# Patient Record
Sex: MALE | Race: WHITE | NOT HISPANIC OR LATINO | URBAN - METROPOLITAN AREA
[De-identification: names, ages, dates, MRNs, and addresses within clinical notes are randomized per-mention and may not be internally consistent; named-entity substitution may affect disease eponyms.]

---

## 2020-10-20 ENCOUNTER — OFFICE VISIT CONVERTED (OUTPATIENT)
Dept: FAMILY MEDICINE CLINIC | Facility: CLINIC | Age: 80
End: 2020-10-20
Attending: NURSE PRACTITIONER

## 2020-10-28 ENCOUNTER — HOSPITAL ENCOUNTER (OUTPATIENT)
Dept: LAB | Facility: HOSPITAL | Age: 80
Discharge: HOME | End: 2020-10-28
Attending: NURSE PRACTITIONER

## 2020-10-28 LAB
ALBUMIN SERPL-MCNC: 3.6 G/DL (ref 3.5–5)
ALBUMIN/GLOB SERPL: 1 {RATIO} (ref 1.4–2.6)
ALP SERPL-CCNC: 116 U/L (ref 56–155)
ALT SERPL-CCNC: 8 U/L (ref 10–40)
ANION GAP SERPL CALC-SCNC: 20 MMOL/L (ref 8–19)
AST SERPL-CCNC: 23 U/L (ref 15–50)
BASOPHILS # BLD AUTO: 0.06 10*3/UL (ref 0–0.2)
BASOPHILS NFR BLD AUTO: 0.7 % (ref 0–3)
BILIRUB SERPL-MCNC: 0.95 MG/DL (ref 0.2–1.3)
BNP SERPL-MCNC: 4616 PG/ML (ref 0–1800)
BUN SERPL-MCNC: 36 MG/DL (ref 5–25)
BUN/CREAT SERPL: 28 {RATIO} (ref 6–20)
CALCIUM SERPL-MCNC: 9 MG/DL (ref 8.7–10.4)
CHLORIDE SERPL-SCNC: 105 MMOL/L (ref 99–111)
CHOLEST SERPL-MCNC: 90 MG/DL (ref 107–200)
CHOLEST/HDLC SERPL: 2.9 {RATIO} (ref 3–6)
CONV ABS IMM GRAN: 0.06 10*3/UL (ref 0–0.2)
CONV CO2: 16 MMOL/L (ref 22–32)
CONV IMMATURE GRAN: 0.7 % (ref 0–1.8)
CONV TOTAL PROTEIN: 7.3 G/DL (ref 6.3–8.2)
CREAT UR-MCNC: 1.27 MG/DL (ref 0.7–1.2)
DEPRECATED RDW RBC AUTO: 54.5 FL (ref 35.1–43.9)
EOSINOPHIL # BLD AUTO: 0.15 10*3/UL (ref 0–0.7)
EOSINOPHIL # BLD AUTO: 1.7 % (ref 0–7)
ERYTHROCYTE [DISTWIDTH] IN BLOOD BY AUTOMATED COUNT: 16.7 % (ref 11.6–14.4)
GFR SERPLBLD BASED ON 1.73 SQ M-ARVRAT: 53 ML/MIN/{1.73_M2}
GLOBULIN UR ELPH-MCNC: 3.7 G/DL (ref 2–3.5)
GLUCOSE SERPL-MCNC: 129 MG/DL (ref 70–99)
HCT VFR BLD AUTO: 29.3 % (ref 42–52)
HDLC SERPL-MCNC: 31 MG/DL (ref 40–60)
HGB BLD-MCNC: 9 G/DL (ref 14–18)
LDLC SERPL CALC-MCNC: 49 MG/DL (ref 70–100)
LYMPHOCYTES # BLD AUTO: 1.05 10*3/UL (ref 1–5)
LYMPHOCYTES NFR BLD AUTO: 12 % (ref 20–45)
MCH RBC QN AUTO: 27.6 PG (ref 27–31)
MCHC RBC AUTO-ENTMCNC: 30.7 G/DL (ref 33–37)
MCV RBC AUTO: 89.9 FL (ref 80–96)
MONOCYTES # BLD AUTO: 0.97 10*3/UL (ref 0.2–1.2)
MONOCYTES NFR BLD AUTO: 11.1 % (ref 3–10)
NEUTROPHILS # BLD AUTO: 6.48 10*3/UL (ref 2–8)
NEUTROPHILS NFR BLD AUTO: 73.8 % (ref 30–85)
NRBC CBCN: 0 % (ref 0–0.7)
OSMOLALITY SERPL CALC.SUM OF ELEC: 290 MOSM/KG (ref 273–304)
PLATELET # BLD AUTO: 251 10*3/UL (ref 130–400)
PMV BLD AUTO: 11 FL (ref 9.4–12.4)
POTASSIUM SERPL-SCNC: 5.7 MMOL/L (ref 3.5–5.3)
RBC # BLD AUTO: 3.26 10*6/UL (ref 4.7–6.1)
SODIUM SERPL-SCNC: 135 MMOL/L (ref 135–147)
TRIGL SERPL-MCNC: 52 MG/DL (ref 40–150)
VLDLC SERPL-MCNC: 10 MG/DL (ref 5–37)
WBC # BLD AUTO: 8.77 10*3/UL (ref 4.8–10.8)

## 2020-10-30 LAB
EST. AVERAGE GLUCOSE BLD GHB EST-MCNC: 105 MG/DL
HBA1C MFR BLD: 5.3 % (ref 3.5–5.7)

## 2020-11-12 ENCOUNTER — HOSPITAL ENCOUNTER (OUTPATIENT)
Dept: OTHER | Facility: HOSPITAL | Age: 80
Discharge: HOME | End: 2020-11-12
Attending: NURSE PRACTITIONER

## 2020-11-12 LAB
ANION GAP SERPL CALC-SCNC: 16 MMOL/L (ref 8–19)
BUN SERPL-MCNC: 47 MG/DL (ref 5–25)
BUN/CREAT SERPL: 32 {RATIO} (ref 6–20)
CALCIUM SERPL-MCNC: 9.2 MG/DL (ref 8.7–10.4)
CHLORIDE SERPL-SCNC: 100 MMOL/L (ref 99–111)
CONV CO2: 22 MMOL/L (ref 22–32)
CREAT UR-MCNC: 1.48 MG/DL (ref 0.7–1.2)
GFR SERPLBLD BASED ON 1.73 SQ M-ARVRAT: 44 ML/MIN/{1.73_M2}
GLUCOSE SERPL-MCNC: 88 MG/DL (ref 70–99)
OSMOLALITY SERPL CALC.SUM OF ELEC: 290 MOSM/KG (ref 273–304)
POTASSIUM SERPL-SCNC: 4.2 MMOL/L (ref 3.5–5.3)
SODIUM SERPL-SCNC: 134 MMOL/L (ref 135–147)

## 2020-11-13 LAB — BNP SERPL-MCNC: 2458 PG/ML (ref 0–1800)

## 2020-11-25 ENCOUNTER — OFFICE VISIT CONVERTED (OUTPATIENT)
Dept: CARDIOLOGY | Facility: CLINIC | Age: 80
End: 2020-11-25
Attending: INTERNAL MEDICINE

## 2020-12-09 ENCOUNTER — HOSPITAL ENCOUNTER (OUTPATIENT)
Dept: LAB | Facility: HOSPITAL | Age: 80
Discharge: HOME | End: 2020-12-09
Attending: INTERNAL MEDICINE

## 2021-01-14 ENCOUNTER — HOSPITAL ENCOUNTER (OUTPATIENT)
Dept: LAB | Facility: HOSPITAL | Age: 81
Discharge: HOME | End: 2021-01-14
Attending: NURSE PRACTITIONER

## 2021-01-14 LAB
ANION GAP SERPL CALC-SCNC: 19 MMOL/L (ref 8–19)
BUN SERPL-MCNC: 71 MG/DL (ref 5–25)
BUN/CREAT SERPL: 41 {RATIO} (ref 6–20)
CALCIUM SERPL-MCNC: 8.7 MG/DL (ref 8.7–10.4)
CHLORIDE SERPL-SCNC: 98 MMOL/L (ref 99–111)
CONV CO2: 20 MMOL/L (ref 22–32)
CREAT UR-MCNC: 1.74 MG/DL (ref 0.7–1.2)
GFR SERPLBLD BASED ON 1.73 SQ M-ARVRAT: 36 ML/MIN/{1.73_M2}
GLUCOSE SERPL-MCNC: 110 MG/DL (ref 70–99)
OSMOLALITY SERPL CALC.SUM OF ELEC: 295 MOSM/KG (ref 273–304)
POTASSIUM SERPL-SCNC: 5.1 MMOL/L (ref 3.5–5.3)
SODIUM SERPL-SCNC: 132 MMOL/L (ref 135–147)

## 2021-01-22 ENCOUNTER — HOSPITAL ENCOUNTER (OUTPATIENT)
Dept: OTHER | Facility: HOSPITAL | Age: 81
Discharge: HOME | End: 2021-01-22
Attending: NURSE PRACTITIONER

## 2021-01-22 LAB
ANION GAP SERPL CALC-SCNC: 15 MMOL/L (ref 8–19)
BUN SERPL-MCNC: 68 MG/DL (ref 5–25)
BUN/CREAT SERPL: 43 {RATIO} (ref 6–20)
CALCIUM SERPL-MCNC: 8.5 MG/DL (ref 8.7–10.4)
CHLORIDE SERPL-SCNC: 102 MMOL/L (ref 99–111)
CONV CO2: 20 MMOL/L (ref 22–32)
CREAT UR-MCNC: 1.59 MG/DL (ref 0.7–1.2)
GFR SERPLBLD BASED ON 1.73 SQ M-ARVRAT: 40 ML/MIN/{1.73_M2}
GLUCOSE SERPL-MCNC: 117 MG/DL (ref 70–99)
OSMOLALITY SERPL CALC.SUM OF ELEC: 295 MOSM/KG (ref 273–304)
POTASSIUM SERPL-SCNC: 5.3 MMOL/L (ref 3.5–5.3)
SODIUM SERPL-SCNC: 132 MMOL/L (ref 135–147)

## 2021-05-10 NOTE — H&P
History and Physical      Patient Name: Savanah Benoit   Patient ID: [id number]   Sex: Male   YOB: 1940    Primary Care Provider: Karthikeyan DOGULAS    Visit Date: October 20, 2020    Provider: MISAEL Blake   Location: [facility]   Location Address: [address] [place] [address], [address]  [place], [place]  [id number]   Location Phone: [phone]          Chief Complaint  · Establish Care      History Of Present Illness  Savanah Benoit is a 79 year old /White male who presents for evaluation and treatment of:      Patient presents to the office today to establish care.    She recently moved here from [place].  His daughter is with him during the visit.  Patient does have a history of congestive heart failure, atrial fibrillation as well as hypertension.  Patient recently was experiencing dizziness unsteady gait.  Patient was on irbesartan as well as hydrochlorothiazide.  It was found his blood pressures were running in the 90s systolically.  He has previous cardiothoracic surgeon discontinued the irbesartan hydrochlorothiazide.  Patient's blood pressure arrival today is 126/74.  He denies any chest pain shortness breath palpitations at this time.  Patient states that he seems to be feeling better.  They are requesting referral to cardiology Dr. Dover.  Patient did have an echocardiogram and a CT of his chest while at his previous hospital.  These records will be obtained for review.  They did discover that patient has cirrhosis although per the daughter his LFTs were unremarkable.  Patient has a history of heavy drinking but states that he has continued drinking alcohol.  Patient is currently being evaluated by PT and OT and home as well as home health.  Patient's last colonoscopy was 2017 which was unremarkable.  He had a PSA completed this year which was also unremarkable.       Past Surgical History  Procedure Name Date Notes   Cataract surgery --  --     Pacemaker --  --          Medication List  Name Date Started Instructions   allopurinol 100 mg oral tablet  take 2 tablets by oral route daily   aspirin 81 mg oral tablet,delayed release (DR/EC)  take 1 tablet (81 mg) by oral route once daily   fluoxetine 40 mg oral capsule  take 1 capsule (40 mg) by oral route once daily in the evening   pantoprazole 40 mg oral tablet,delayed release (DR/EC)  take 1 tablet (40 mg) by oral route once daily   Percocet  mg oral tablet  take 1 tablet by oral route every 6 hours as needed   spironolactone 50 mg oral tablet  take 1 tablet (50 mg) by oral route once daily   Xanax 0.25 mg oral tablet  take 1 tablet by oral route once a day (at bedtime)       Allergies Reconciled  Social History  Finding Status Start/Stop Quantity Notes   Alcohol Former --/-- --  quit 4 weeks ago prior heavy use   Alcohol consumption less than one to two days per week --  --/-- --  --    Tobacco Former --/-- --  Smoked 20 years 3 packs a day quit in 1993         Immunizations  NameDate Admin Mfg Trade Name Lot Number Route Inj VIS Given VIS Publication   [id number] NE Not Entered  NE NE     Comments:          Review of Systems  · Constitutional  o Denies  o : fatigue, night sweats  · Eyes  o Denies  o : double vision, blurred vision  · HENT  o Denies  o : vertigo, recent head injury  · Breasts  o Denies  o : abnormal changes in breast size, additional breast symptoms except as noted in the HPI  · Cardiovascular  o Denies  o : chest pain, irregular heart beats  · Respiratory  o Denies  o : shortness of breath, productive cough  · Gastrointestinal  o Denies  o : nausea, vomiting  · Genitourinary  o Denies  o : dysuria, urinary retention  · Integument  o Denies  o : hair growth change, new skin lesions  · Neurologic  o Denies  o : altered mental status, seizures  · Musculoskeletal  o Denies  o : joint swelling, limitation of motion  · Endocrine  o Denies  o : cold intolerance, heat  "intolerance  · Heme-Lymph  o Denies  o : petechiae, lymph node enlargement or tenderness  · Allergic-Immunologic  o Denies  o : frequent illnesses      Vitals  Date Time BP Position Site L\R Cuff Size HR RR TEMP (F) WT  HT  BMI kg/m2 BSA m2 O2 Sat FR L/min FiO2 HC       10/20/2020 10:48 /74 Sitting    74 - R  97 198lbs 0oz 6'  1\" 26.12 2.15 96 %            Physical Examination  · Constitutional  o Appearance  o : well-nourished, in no acute distress  · Neck  o Inspection/Palpation  o : normal appearance, no masses or tenderness, trachea midline  o Range of Motion  o : cervical range of motion within normal limits  o Thyroid  o : gland size normal, nontender, no nodules or masses present on palpation  · Respiratory  o Respiratory Effort  o : breathing unlabored  o Inspection of Chest  o : normal appearance  o Auscultation of Lungs  o : normal breath sounds throughout inspiration and expiration  · Cardiovascular  o Heart  o :   § Auscultation of Heart  § : regular rate and rhythm, no murmurs, gallops or rubs  o Peripheral Vascular System  o :   § Pedal Pulses  § : pulses 2 bilaterally  § Extremities  § : no clubbing or edema  · Skin and Subcutaneous Tissue  o General Inspection  o : no rashes or lesions present, no areas of discoloration  o Body Hair  o : hair normal for age, general body hair distribution normal for age  o Digits and Nails  o : no clubbing, cyanosis, deformities or edema present, normal appearing nails  · Neurologic  o Mental Status Examination  o :   § Orientation  § : grossly oriented to person, place and time  o Gait and Station  o : normal gait, able to stand without difficulty  · Psychiatric  o Judgement and Insight  o : judgment and insight intact  o Mood and Affect  o : mood normal, affect appropriate  o Presence of Abnormal Thoughts  o : no hallucinations, no delusions present, no psychotic thoughts          Assessment  · Screening for depression     V79.0/Z13.89  · Screening for lipid " disorders     V77.91/Z13.220  · Anemia     285.9/D64.9  · CHF (congestive heart failure)     428.0/I50.9  · Essential hypertension     401.9/I10  · GERD (gastroesophageal reflux disease)     530.81/K21.9  · Atrial fibrillation     427.31/I48.91  · Exertional shortness of breath     786.05/R06.02  · Cirrhosis     571.5/K74.60  · Gout     274.9/M10.9  · Establishing care with new doctor, encounter for     V65.8/Z76.89      Plan  · Orders  o Annual depression screening, 15 minutes (07869, ) - V79.0/Z13.89 - 10/20/2020  o ACO-18: Positive screen for clinical depression using a standardized tool and a follow-up plan documented () - V79.0/Z13.89 - 10/20/2020  o CBC with Auto Diff [facility] (55960) - - 10/20/2020  o CMP [facility] ([id number]) - - 10/20/2020  o Hgb A1c [facility] (57705) - - 10/20/2020  o Lipid Panel [facility] (78928) - - 10/20/2020  o ACO-39: Current medications updated and reviewed (, 1159F) - - 10/20/2020  o ACO-18: Positive screen for clinical depression using a standardized tool and a follow-up plan documented () - - 10/20/2020  o ACO-13: Fall Risk Screening with 2 or more falls in past year or any fall with injury in the past year (1100F) - - 10/20/2020  o BNP (brain natriuretic peptide measurement) (86687) - - 10/20/2020  · Medications  o allopurinol 100 mg oral tablet   SIG: take 1 tablet by oral route daily   DISP: (90) Tablet with 1 refills  Adjusted on 10/20/2020     o aspirin 81 mg oral tablet,delayed release (DR/EC)   SIG: take 1 tablet (81 mg) by oral route once daily   DISP: (90) Tablet with 1 refills  Adjusted on 10/20/2020     o fluoxetine 40 mg oral capsule   SIG: take 1 capsule (40 mg) by oral route once daily in the evening   DISP: (90) Capsule with 1 refills  Adjusted on 10/20/2020     o pantoprazole 40 mg oral tablet,delayed release (DR/EC)   SIG: take 1 tablet (40 mg) by oral route once daily   DISP: (90) Tablet with 1 refills  Adjusted on 10/20/2020     o spironolactone 50 mg oral tablet    SIG: take 1 tablet (50 mg) by oral route once daily   DISP: (90) Tablet with 1 refills  Adjusted on 10/20/2020     o Medications have been Reconciled  o Transition of Care or Provider Policy  · Instructions  o Depression Screen completed and scanned into the EMR under the designated folder within the patient's documents.  o Today's PHQ-9 result is _8__  o Patient advised to monitor blood pressure (B/P) at home and journal readings. Patient informed that a B/P reading at home of more than 130/80 is considered hypertension. For readings greater uzie897/90 or higher patient is advised to follow up in the office with readings for management. Patient advised to limit sodium intake.  o Patient was educated/instructed on their diagnosis, treatment and medications prior to discharge from the clinic today.  o Minutes spent with patient including greater than 50% in Education/Counseling/Care Coordination.  o Time spent with the patient was minutes, more than 50% face to face.  · Disposition  o Call or Return if symptoms worsen or persist.  o follow up in 6 months  o Care Transition  o WIL Sent            Electronically Signed by: MISAEL Blake -Author on October 20, 2020 01:02:55 PM

## 2021-05-10 NOTE — H&P
History and Physical      Patient Name: Savanah Benoit   Patient ID: [id number]   Sex: Male   YOB: 1940    Primary Care Provider: Karthikeyan DOUGLAS   Referring Provider: Karthikeyan DOUGLAS    Visit Date: November 25, 2020    Provider: Cory Dover MD   Location: [facility]   Location Address: [address] [place] [address], [address]   [place]  [id number]   Location Phone: [phone]          Chief Complaint     Bradycardia.  CHF.       History Of Present Illness  Consult requested by: Karthikeyan DOUGLAS   Savanah Benoit is an 79 year old /White male with a history of hypertension, bradycardia with dual-chamber pacemaker, previous aortic stenosis with TAVR, and diastolic CHF who is here to establish care. The patient reports stable shortness of breath symptoms with exertion, but no worsening breathing problems at rest and no edema or chest pain. No syncope or tachycardia issues.   PAST MEDICAL HISTORY: Hypertension; Bradycardia with dual-chamber pacemaker; Diastolic congestive heart failure; Previous aortic stenosis with TAVR; Prior GI bleed/ulcer in 2017.   FAMILY MEDICAL HISTORY: Nonsignificant for premature CAD.   PSYCHOSOCIAL HISTORY: Previously smoked, but quit. Does not use alcohol. Caffeine daily. .   CURRENT MEDICATIONS: Aspirin 81 mg daily; spironolactone 25 mg every other day; pantoprazole 40 mg daily; allopurinol 100 mg daily; torsemide 20 mg p.r.n.; fluoxetine 80 mg daily; Norco 5-325 mg p.r.n.; Xanax 0.25 mg p.r.n.   ALLERGIES: Metformin.       Review of Systems  · Constitutional  o Admits  o : good general health lately  o Denies  o : fatigue, recent weight changes   · Eyes  o Denies  o : double vision  · HENT  o Denies  o : hearing loss or ringing, chronic sinus problem, swollen glands in neck  · Cardiovascular  o Admits  o : shortness of breath with activities   o Denies  o : chest pain, palpitations (fast, fluttering, or skipping beats), swelling (feet,  "ankles, hands)  · Respiratory  o Denies  o : asthma or wheezing, COPD  · Gastrointestinal  o Denies  o : ulcers, nausea or vomiting  · Neurologic  o Denies  o : lightheaded or dizzy, stroke, headaches  · Musculoskeletal  o Admits  o : joint pain  o Denies  o : back pain  · Endocrine  o Denies  o : thyroid disease, diabetes, heat or cold intolerance, excessive thirst or urination  · Heme-Lymph  o Admits  o : bleeding or bruising tendency, anemia      Vitals  Date Time BP Position Site L\R Cuff Size HR RR TEMP (F) WT  HT  BMI kg/m2 BSA m2 O2 Sat FR L/min FiO2 HC       10/20/2020 10:48 /74 Sitting    74 - R  97 198lbs 0oz 6'  1\" 26.12 2.15 96 %            Physical Examination  · Constitutional  o Appearance  o : Awake, alert, in no acute distress.   · Head and Face  o HEENT  o : PERRLA.  · Eyes  o Conjunctivae  o : Normal.  · Ears, Nose, Mouth and Throat  o Oral Cavity  o :   § Oral Mucosa  § : Normal.  · Neck  o Inspection/Palpation  o : No JVD.  · Respiratory  o Respiratory  o : Chest is symmetrical. Lung fields are clear. No rhonchi or wheezes heard.  · Cardiovascular  o Heart  o :   § Auscultation of Heart  § : S1, S2 normal. Regular rate and rhythm without murmurs, gallops, or rubs.  o Peripheral Vascular System  o :   § Extremities  § : Nails normal. No clubbing or cyanosis. Femoral pulses adequate. Pedal pulses adequate. No peripheral edema.  · Gastrointestinal  o Abdominal Examination  o : Abdomen soft. No masses. No guarding or rigidity. No hepatosplenomegaly. Bowel sounds normal.  · Musculoskeletal  o General  o :   § General Musculoskeletal  § : Muscle tone and strength were normal.  · Skin and Subcutaneous Tissue  o General Inspection  o : Unremarkable.  · Device Interrogation  o Device Interrogation  o : Patient had an interrogation of his single-chamber pacemaker. Right atrial lead was in atrial fibrillation. Right ventricle showed normal function and paced 95% of the time. 100% Afib. No changes " made to the device.           Assessment     ASSESSMENT & PLAN:    1.  Aortic stenosis with TAVR in 2017.  Clinically is doing well.  No evidence of any stenosis on auscultation        today.    2.  Atrial fibrillation, chronic, rate controlled.  Patient is not on anticoagulation due to previous GI bleed/ulcer        other than just aspirin 81 mg daily.  3.  Hypertension, controlled.             Electronically Signed by: Latia White-, Other -Author on December 7, 2020 07:44:03 AM  Electronically Co-signed by: Cory Dover MD -Reviewer on December 10, 2020 09:02:25 AM

## 2021-05-14 VITALS
HEART RATE: 74 BPM | TEMPERATURE: 97 F | BODY MASS INDEX: 26.24 KG/M2 | DIASTOLIC BLOOD PRESSURE: 74 MMHG | OXYGEN SATURATION: 96 % | HEIGHT: 73 IN | SYSTOLIC BLOOD PRESSURE: 126 MMHG | WEIGHT: 198 LBS

## 2021-07-28 ENCOUNTER — TELEPHONE (OUTPATIENT)
Dept: CARDIOLOGY | Facility: CLINIC | Age: 81
End: 2021-07-28

## 2021-07-28 NOTE — TELEPHONE ENCOUNTER
I requested patient to send in a home remote download.  This is to be sent manually with the monitor.